# Patient Record
Sex: FEMALE | Race: WHITE
[De-identification: names, ages, dates, MRNs, and addresses within clinical notes are randomized per-mention and may not be internally consistent; named-entity substitution may affect disease eponyms.]

---

## 2019-07-14 ENCOUNTER — HOSPITAL ENCOUNTER (EMERGENCY)
Dept: HOSPITAL 11 - JP.ED | Age: 74
Discharge: HOME | End: 2019-07-14
Payer: MEDICARE

## 2019-07-14 ENCOUNTER — HOSPITAL ENCOUNTER (OUTPATIENT)
Dept: HOSPITAL 11 - JP.ED | Age: 74
Setting detail: OBSERVATION
LOS: 1 days | Discharge: HOME | End: 2019-07-15
Attending: FAMILY MEDICINE | Admitting: FAMILY MEDICINE
Payer: MEDICARE

## 2019-07-14 DIAGNOSIS — J45.41: Primary | ICD-10-CM

## 2019-07-14 DIAGNOSIS — Z79.51: ICD-10-CM

## 2019-07-14 DIAGNOSIS — Z79.899: ICD-10-CM

## 2019-07-14 DIAGNOSIS — Z79.01: ICD-10-CM

## 2019-07-14 DIAGNOSIS — F32.9: ICD-10-CM

## 2019-07-14 DIAGNOSIS — Z88.8: ICD-10-CM

## 2019-07-14 DIAGNOSIS — Z87.891: ICD-10-CM

## 2019-07-14 DIAGNOSIS — F41.9: ICD-10-CM

## 2019-07-14 DIAGNOSIS — F41.8: ICD-10-CM

## 2019-07-14 DIAGNOSIS — J44.9: ICD-10-CM

## 2019-07-14 DIAGNOSIS — Z91.09: ICD-10-CM

## 2019-07-14 DIAGNOSIS — J98.11: ICD-10-CM

## 2019-07-14 DIAGNOSIS — Z86.711: ICD-10-CM

## 2019-07-14 PROCEDURE — 71046 X-RAY EXAM CHEST 2 VIEWS: CPT

## 2019-07-14 PROCEDURE — 94640 AIRWAY INHALATION TREATMENT: CPT

## 2019-07-14 PROCEDURE — G0378 HOSPITAL OBSERVATION PER HR: HCPCS

## 2019-07-14 PROCEDURE — 99284 EMERGENCY DEPT VISIT MOD MDM: CPT

## 2019-07-14 NOTE — CRLCR
INDICATION: Dyspnea



TECHNIQUE: Chest radiograph 2 views



COMPARISON: None



FINDINGS: 



Mediastinum: The mediastinum is normal in appearance. The heart silhouette 

is normal in size and morphology. Mild elevation right hemidiaphragm is 

seen. 



Lung: Mild bibasilar discoid atelectasis is present. Left lateral 

costophrenic sulcus is excluded cannot be evaluated. No pneumothorax is 

identified. 







IMPRESSION: 



1. Mild bibasilar discoid atelectasis is present.



Dictated by Dg Chow MD @ 7/14/2019 11:53:14 PM



Dictated by: Dg Chow MD @ 07/14/2019 23:53:17



(Electronically Signed)

## 2019-07-14 NOTE — EDM.PDOC
ED HPI GENERAL MEDICAL PROBLEM





- General


Chief Complaint: Respiratory Problem


Stated Complaint: SOB


Time Seen by Provider: 07/14/19 17:38


Source of Information: Reports: Patient, Family


History Limitations: Reports: No Limitations





- History of Present Illness


INITIAL COMMENTS - FREE TEXT/NARRATIVE: 





73 yo asthmatic female presents to ER wheezy and SOB.  She has been without her 

inhaled steroid for 1 week.  She has had increase in wheezing for the last 2 

days and has been using her albuterol MDI.  Her granddaughter has steroid nebs 

and she did take one of these this afternoon.  She has seasonal allergies and 

takes daily low dose steroid.  denies fever, chills, or general illness 





- Related Data


 Allergies











Allergy/AdvReac Type Severity Reaction Status Date / Time


 


bupropion Allergy  Cannot Verified 07/14/19 17:09





   Remember  











Home Meds: 


 Home Meds





Budesonide [Pulmicort] 2 inh INH BID 07/14/19 [History]


Cetirizine HCl [Zyrtec] 10 mg PO DAILY 07/14/19 [History]


Citalopram Hydrobromide [Celexa] 20 mg PO DAILY 07/14/19 [History]


Fluticasone Propionate [Flonase Allergy Relief] 1 spray NASBOTH BID 07/14/19 [

History]


Furosemide [Lasix] 20 mg PO DAILY 07/14/19 [History]


Warfarin [Coumadin] 10 mg PO ASDIRECTED 07/14/19 [History]


Warfarin [Coumadin] 15 mg PO ASDIRECTED 07/14/19 [History]


predniSONE [Prednisone] 2.5 mg PO ASDIRECTED 07/14/19 [History]


predniSONE [Prednisone] 5 mg PO ASDIRECTED 07/14/19 [History]


traZODone 100 mg PO BEDTIME 07/14/19 [History]











Past Medical History


HEENT History: Reports: None


Respiratory History: Reports: Asthma, COPD, PE


Gastrointestinal History: Reports: None


Genitourinary History: Reports: None


Musculoskeletal History: Reports: Arthritis


Psychiatric History: Reports: Anxiety, Depression


Hematologic History: Reports: Other (See Below)


Other Hematologic History: takes coumadin for a clotting disorder





- Past Surgical History


Head Surgeries/Procedures: Reports: None


HEENT Surgical History: Reports: Cataract Surgery


Respiratory Surgical History: Reports: None


GI Surgical History: Reports: Other (See Below)


Other GI Surgeries/Procedures: hernia


Female  Surgical History: Reports: Hysterectomy


Musculoskeletal Surgical History: Reports: None


Dermatological Surgical History: Reports: None





Social & Family History





- Tobacco Use


Smoking Status *Q: Never Smoker





- Caffeine Use


Caffeine Use: Reports: Coffee, Soda, Tea





- Alcohol Use


Days Per Week of Alcohol Use: 7


Number of Drinks Per Day: 1


Total Drinks Per Week: 7





- Recreational Drug Use


Recreational Drug Use: No





ED ROS GENERAL





- Review of Systems


Review Of Systems: See Below


Constitutional: Denies: Fever, Chills


Respiratory: Reports: Shortness of Breath, Wheezing


Cardiovascular: Denies: Chest Pain


Skin: Denies: Rash





ED EXAM, GENERAL





- Physical Exam


Exam: See Below


Exam Limited By: No Limitations


General Appearance: Alert, WD/WN, No Apparent Distress


Head: Atraumatic, Normocephalic


Respiratory/Chest: Decreased Breath Sounds, Wheezing


Cardiovascular: Regular Rate, Rhythm, No Murmur, No Rub


GI/Abdominal: Normal Bowel Sounds, Soft, Non-Tender


Neurological: Alert, Oriented


Psychiatric: Normal Affect, Normal Mood


Skin Exam: Warm, Dry, Intact, Normal Color, No Rash


Lymphatic: No Adenopathy





Course





- Vital Signs


Last Recorded V/S: 


 Last Vital Signs











Temp  36.2 C   07/14/19 17:05


 


Pulse  80   07/14/19 18:04


 


Resp  20   07/14/19 17:05


 


BP  157/63 H  07/14/19 18:04


 


Pulse Ox  95   07/14/19 18:04














- Orders/Labs/Meds


Orders: 


 Active Orders 24 hr











 Category Date Time Status


 


 RT Aerosol Therapy [RC] ASDIRECTED Care  07/14/19 17:44 Active











Meds: 


Medications














Discontinued Medications














Generic Name Dose Route Start Last Admin





  Trade Name Christel  PRN Reason Stop Dose Admin


 


Albuterol/Ipratropium  3 ml  07/14/19 17:43  07/14/19 17:47





  Duoneb 3.0-0.5 Mg/3 Ml  NEB  07/14/19 17:44  3 ml





  ONETIME ONE   Administration





     





     





     





     














- Re-Assessments/Exams


Free Text/Narrative Re-Assessment/Exam: 





07/14/19 18:28


great response from duo neb, ease of breathing , wheezing resolved 





Departure





- Departure


Time of Disposition: 18:18


Disposition: Home, Self-Care 01


Condition: Good


Clinical Impression: 


Exacerbation of asthma


Qualifiers:


 Asthma severity: moderate Asthma persistence: persistent Qualified Code(s): 

J45.41 - Moderate persistent asthma with (acute) exacerbation








- Discharge Information


*PRESCRIPTION DRUG MONITORING PROGRAM REVIEWED*: Not Applicable


*COPY OF PRESCRIPTION DRUG MONITORING REPORT IN PATIENT DANA: Not Applicable


Instructions:  Asthma Attack


Referrals: 


PCP,None [Primary Care Provider] - 


Forms:  ED Department Discharge


Additional Instructions: 


prednisone 10 mg - take 4 tablets for 4 days, then 2 tablets for 3 days


use Duo neb every 4 hours as needed


increase fluid intake with goal of 1 liter








- My Orders


Last 24 Hours: 


My Active Orders





07/14/19 17:44


RT Aerosol Therapy [RC] ASDIRECTED 














- Assessment/Plan


Last 24 Hours: 


My Active Orders





07/14/19 17:44


RT Aerosol Therapy [RC] ASDIRECTED

## 2019-07-14 NOTE — EDM.PDOC
ED HPI GENERAL MEDICAL PROBLEM





- General


Chief Complaint: Respiratory Problem


Stated Complaint: SHORTNESS OF BREATH


Time Seen by Provider: 07/14/19 23:30


Source of Information: Reports: Patient, Family


History Limitations: Reports: No Limitations





- History of Present Illness


INITIAL COMMENTS - FREE TEXT/NARRATIVE: 





74-year-old female with long-standing asthma, is suffering from an acute 

exacerbation over the past 3 days since running out of her Pulmicort. She was 

in the emergency room earlier today, received a DuoNeb and responded well. A 

prescription was given for a nebulizer, prednisone, but she was to start the 

prednisone tomorrow. They tried to get the nebulizer filled but there was none 

available. She's been using her albuterol hand-held inhaler every few hours but 

feels worse than when she came in the first time. She arrived very anxious, 

short of breath with O2 saturations in the mid to upper 80s. She denies any 

pain.


Onset: Gradual


Duration: Day(s): (The last 48 hours)


Associated Symptoms: Reports: Cough, Shortness of Breath.  Denies: Fever/Chills

, Headaches


Treatments PTA: Reports: Other (see below)


Other Treatments PTA: benadryl





- Related Data


 Allergies











Allergy/AdvReac Type Severity Reaction Status Date / Time


 


bupropion Allergy  Cannot Verified 07/14/19 17:09





   Remember  











Home Meds: 


 Home Meds





Budesonide [Pulmicort] 2 inh INH BID 07/14/19 [History]


Cetirizine HCl [Zyrtec] 10 mg PO DAILY 07/14/19 [History]


Citalopram Hydrobromide [Celexa] 20 mg PO DAILY 07/14/19 [History]


Fluticasone Propionate [Flonase Allergy Relief] 1 spray NASBOTH BID 07/14/19 [

History]


Furosemide [Lasix] 20 mg PO DAILY 07/14/19 [History]


Warfarin [Coumadin] 10 mg PO ASDIRECTED 07/14/19 [History]


Warfarin [Coumadin] 15 mg PO ASDIRECTED 07/14/19 [History]


predniSONE [Prednisone] 2.5 mg PO ASDIRECTED 07/14/19 [History]


predniSONE [Prednisone] 5 mg PO ASDIRECTED 07/14/19 [History]


traZODone 100 mg PO BEDTIME 07/14/19 [History]











Past Medical History


HEENT History: Reports: None


Respiratory History: Reports: Asthma, COPD, PE


Gastrointestinal History: Reports: None


Genitourinary History: Reports: None


Musculoskeletal History: Reports: Arthritis


Psychiatric History: Reports: Anxiety, Depression


Hematologic History: Reports: Other (See Below)


Other Hematologic History: takes coumadin for a clotting disorder





- Past Surgical History


Head Surgeries/Procedures: Reports: None


HEENT Surgical History: Reports: Cataract Surgery


Respiratory Surgical History: Reports: None


GI Surgical History: Reports: Other (See Below)


Other GI Surgeries/Procedures: hernia


Female  Surgical History: Reports: Hysterectomy


Musculoskeletal Surgical History: Reports: None


Dermatological Surgical History: Reports: None





Social & Family History





- Tobacco Use


Smoking Status *Q: Never Smoker


Second Hand Smoke Exposure: No





- Caffeine Use


Caffeine Use: Reports: Coffee





- Recreational Drug Use


Recreational Drug Use: No





ED ROS GENERAL





- Review of Systems


Review Of Systems: See Below


Constitutional: Reports: Malaise.  Denies: Fever, Chills


HEENT: Denies: Throat Pain


Respiratory: Reports: Shortness of Breath, Wheezing, Cough.  Denies: Sputum


Cardiovascular: Denies: Chest Pain


GI/Abdominal: Reports: Nausea, Vomiting.  Denies: Abdominal Pain


: Reports: No Symptoms


Skin: Reports: No Symptoms


Neurological: Denies: Headache





ED EXAM, GENERAL





- Physical Exam


Exam: See Below


Exam Limited By: No Limitations


General Appearance: Alert, Anxious, Mild Distress


Head: Atraumatic


Respiratory/Chest: Respiratory Distress (Mild respiratory distress on arrival, 

audible expiratory wheezes), Accessory Muscle Use (Mild sensory muscle use and 

increased respirations at 22)


Cardiovascular: Regular Rate, Rhythm


Extremities: Normal Inspection


Neurological: Alert, Oriented


Psychiatric: Anxious


Skin Exam: Warm, Dry





Course





- Vital Signs


Last Recorded V/S: 


 Last Vital Signs











Temp  96.0 F   07/15/19 00:30


 


Pulse  79   07/15/19 00:30


 


Resp  20   07/15/19 00:30


 


BP  155/86 H  07/15/19 00:30


 


Pulse Ox  93 L  07/15/19 00:30














- Orders/Labs/Meds


Orders: 


 Medication Orders





Albuterol/Ipratropium (Duoneb 3.0-0.5 Mg/3 Ml)  3 ml NEB Q6H [facility]


Albuterol/Ipratropium (Duoneb 3.0-0.5 Mg/3 Ml)  3 ml NEB Q3H PRN


   PRN Reason: dyspnea


Cetirizine HCl (Zyrtec)  10 mg PO DAILY [facility]


Citalopram Hydrobromide (Celexa)  20 mg PO DAILY [facility]


Fluticasone Propionate (Flonase)  0 gm NASBOTH BID [facility]


Furosemide (Lasix)  20 mg PO DAILY [facility]


Methylprednisolone Sodium Succinate (Solu-Medrol)  125 mg IVPUSH Q6H [facility]


Non-Formulary Medication (Warfarin [Coumadin])  10 mg PO ASDIRECTED [facility]


Non-Formulary Medication (Warfarin [Coumadin])  15 mg PO ASDIRECTED [facility]


Trazodone HCl (Trazodone)  100 mg PO BEDTIME [facility]








Meds: 


Medications











Generic Name Dose Route Start Last Admin





  Trade Name Freq  PRN Reason Stop Dose Admin


 


Albuterol/Ipratropium  3 ml  07/15/19 00:45  





  Duoneb 3.0-0.5 Mg/3 Ml  NEB   





  Q6H [facility]   





     





     





     





     


 


Albuterol/Ipratropium  3 ml  07/15/19 01:38  





  Duoneb 3.0-0.5 Mg/3 Ml  NEB   





  Q3H PRN   





  dyspnea   





     





     





     


 


Cetirizine HCl  10 mg  07/15/19 09:00  





  Zyrtec  PO   





  DAILY [facility]   





     





     





     





     


 


Citalopram Hydrobromide  20 mg  07/15/19 09:00  





  Celexa  PO   





  DAILY [facility]   





     





     





     





     


 


Fluticasone Propionate  0 gm  07/15/19 09:00  





  Flonase  NASBOTH   





  BID [facility]   





     





     





     





     


 


Furosemide  20 mg  07/15/19 09:00  





  Lasix  PO   





  DAILY [facility]   





     





     





     





     


 


Methylprednisolone Sodium Succinate  125 mg  07/15/19 00:45  





  Solu-Medrol  IVPUSH   





  Q6H [facility]   





     





     





     





     


 


Non-Formulary Medication  10 mg  07/15/19 00:30  





  Warfarin [Coumadin]  PO   





  ASDIRECTED [facility]   





     





     





     





     


 


Non-Formulary Medication  15 mg  07/15/19 00:30  





  Warfarin [Coumadin]  PO   





  ASDIRECTED [facility]   





     





     





     





     


 


Trazodone HCl  100 mg  07/15/19 01:38  





  Trazodone  PO   





  BEDTIME [facility]   





     





     





     





     














Discontinued Medications














Generic Name Dose Route Start Last Admin





  Trade Name Freq  PRN Reason Stop Dose Admin


 


Albuterol/Ipratropium  3 ml  07/14/19 23:08  07/14/19 23:13





  Duoneb 3.0-0.5 Mg/3 Ml  NEB  07/14/19 23:09  3 ml





  ONETIME ONE   Administration





     





     





     





     


 


Methylprednisolone Sodium Succinate  125 mg  07/14/19 23:12  07/15/19 00:01





  Solu-Medrol  IVPUSH  07/14/19 23:13  125 mg





  ONETIME ONE   Administration





     





     





     





     


 


Trazodone HCl  100 mg  07/15/19 21:00  





  Trazodone  PO   





  BEDTIME [facility]   





     





     





     





     














- Re-Assessments/Exams


Free Text/Narrative Re-Assessment/Exam: 





07/14/19 23:51


A second DuoNeb was given and the patient responded well enough to have 

saturations in the 90s, normal respiratory rate but still significant wheezing 

and discomfort. An IV was started and she was given 125 mg of IV Solu-Medrol. 

Dr. Glynn was called and agreed to admit the patient to observation for 

treatment for persistent asthma and hypoxia. A two-view chest x-ray was done 

prior to admission and appeared normal.





Departure





- Departure


Time of Disposition: 01:22


Disposition: Admitted As Inpatient 66


Clinical Impression: 


Exacerbation of asthma


Qualifiers:


 Asthma severity: moderate Asthma persistence: persistent Qualified Code(s): 

J45.41 - Moderate persistent asthma with (acute) exacerbation








- Discharge Information

## 2019-07-15 RX ADMIN — DEXTROSE SCH MG: 50 INJECTION, SOLUTION INTRAVENOUS at 06:17

## 2019-07-15 RX ADMIN — DEXTROSE SCH: 50 INJECTION, SOLUTION INTRAVENOUS at 01:43

## 2019-07-15 NOTE — DISCH
REASON FOR ADMISSION:  A 74-year-old female with a history of moderate persistent asthmatic

lung disease, presented to the emergency room with increasing shortness of breath and wheeze

with accompanying cough.  She has a history of chronic respiratory disease, well controlled

with combination of inhaled therapies, including maintenance dose of Pulmicort and p.r.n.

use of albuterol rescue inhaler.  Three days prior to presentation, she noted her Pulmicort

prescription  and with increasing wheeze and shortness of breath.  She saw poor

response to use of albuterol.  She denies acute respiratory infections.  Can identify no new

irrigants, though does have a recognized history of environmental allergies with ongoing use

of Cetirizine with increasing shortness of breath.  She presented for re-evaluation.

 

PHYSICAL EXAMINATION:

VITAL SIGNS:  On admission, vital signs stable.  Afebrile, O2 saturations 93% on room air.

HEENT:  Unremarkable.  No perioral cyanosis.  No nasal congestion or coryza.

NECK:  No stridor or adenopathy.  Brisk carotid pulses.

LUNGS:  Post administration of DuoNeb nebulizer were clear, resonant, non-tachypneic without

wheeze or retractions.

HEART:  Regular without murmurs or gallops noted.

ABDOMEN:  Benign.

EXTREMITIES:  Warm, pink, and dry.  Good pulses.  No pitting edema.

 

IMAGING STUDIES:  Chest x-ray obtained on presentation.  Pulmonary fields are clear.  Mild

bibasilar atelectasis.  No effusion or evidence of infiltrate noted.

 

HOSPITAL COURSE:  Tory was admitted for continued supportive care for underlying asthma

exacerbation.  She was managed with administration of serial doses of Solu-Medrol, as well

as DuoNeb inhalation therapies on a schedule of q.i.d. and q.3 hours p.r.n.  She rested

comfortably through the night.  Vital signs remained stable without evidence of developing

hypoxia.  The following morning, she was upright, felt comfortable and denied any difficulty

with shortness of breath or wheeze.  She had no fevers, chills, cough or purulent sputum

production noted.  Anticipated plans for discharge were made.

 

DISCHARGE INSTRUCTIONS:  We will provide breakfast.  Encourage ambulation in the halls then

if stable, discharge to home this a.m.

 

CONDITION:  Improved.

 

ACTIVITY:

1. Up as tolerated.

2. Diet:  Regular.

3. Follow up with regular caregiver in 1 to 2 weeks.

4. Medications; DuoNeb 1 nebulizer treatment q.6 hours, Cetirizine 10 mg daily, citalopram

    20 mg daily, Flonase 1 spray to each nares b.i.d., furosemide 20 mg daily, warfarin 10

    mg Monday, Wednesday, Friday and 15 mg rest of the week, trazodone 100 mg p.o. at

    bedtime and prednisone in tapering dose over 6 days time.

 

ADMITTING DIAGNOSES:

1. Asthma exacerbation.

2. History of Coumadin anticoagulant therapy for reasons of recurrent pulmonary emboli in

    the remote past.

3. Depression with accompanying anxiety.

 

DISCHARGE DIAGNOSES:

1. Asthma exacerbation.

2. History of Coumadin anticoagulant therapy for reasons of recurrent pulmonary emboli in

    the remote past.

3. Depression with accompanying anxiety.

## 2019-07-15 NOTE — HP
IDENTIFYING DATA:  Tory Blackmon is a 74-year-old   female from [place], [place].

 

CHIEF COMPLAINT:  "My asthma is bad."

 

HISTORY OF PRESENT ILLNESS:  This 74-year-old female has a history of moderate persistent

asthmatic lung disease typically treated with Pulmicort inhalation therapies and p.r.n.

albuterol inhalations with use of metered-dose inhaler.  She reports, approximately 3 days

ago, her Pulmicort  and she, vacationing in the area, was not able to renew the

medication.  She anticipated she would manage with use of albuterol on a p.r.n. basis.  Over

the last 24 hours, she has had diminishing exercise tolerance, increasing wheeze, and

general fatigue.  She presented to the emergency room in the early evening hours and was

provided a DuoNeb nebulizer with good clinical response.  She was given a prescription for a

nebulizer with DuoNeb, as well as oral prednisone.  However, before she was able to fill the

prescription, she had worsening symptoms and returned to the emergency room.  She is now

admitted for an observation overnight.

 

She has had no fevers, chills, coryza, sore throat, cough, pleuritic pain, or sputum

production.  [facility] and has vacationed at this

institution in the past without difficulty.  She reports a history of multiple environmental

allergies.

 

ALLERGIES:  NO NOTED DRUG ALLERGIES WITH THE EXCEPTION OF BUPROPION.

 

 

PAST SURGICAL HISTORY:  Previous surgeries include total hysterectomy with cystocele and

rectocele repair for benign disease.  She has had cataract surgery.

 

PAST MEDICAL HISTORY:  She reports a history of recurrent multiple pulmonary emboli with

resultant longstanding anticoagulation with Coumadin.  She has a history of generalized

anxiety and depression, managed with pharmacologic therapy.

 

HABITS:  Remote history of tobacco use, none for many years.  Caffeine intake averages, 3-5

cups of coffee daily with alcohol use of 6 drinks weekly.

 

IMMUNIZATIONS:  Does not typically receive an annual influenza vaccine.  Unsure if she has

had pneumococcal vaccines.  Last tetanus was approximately 10 years ago.

 

CURRENT MEDICATIONS:  Pulmicort 2 inhalations b.i.d., cetirizine 10 mg daily, citalopram 20

mg daily, Flonase 1 spray to each naris b.i.d., furosemide 20 mg daily p.r.n. edema,

Warfarin 15 mg 4 days weekly and 10 mg 3 days weekly, trazodone 100 mg at bedtime.

Additionally, she was provided a prescription for oral prednisone with ER presentation this

evening has not yet filled this prescription.

 

SOCIAL HISTORY:  Retired.  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  She is .

 accompanies her today.  They are currently spending a week at a local resort in

[place] with family attending.

 

FAMILY HISTORY:  Granddaughter with history of asthmatic lung disease.  No recent family

history of acute respiratory infections or toxic exposures.

 

REVIEW OF SYSTEMS:  NEUROLOGIC:  No history of strokes or seizures.  No headaches.

CARDIAC:  No history of hypertension, diabetes, congenital heart disease, rheumatic fever,

murmur, chest pain, MI, or palpitations.

RESPIRATORY:  As above.

GASTROINTESTINAL:  Denies hepatitis, jaundice, chronic dyspepsia, constipation, melena, or

hematochezia.  GENITOURINARY:  Nocturia x2, scant overflow incontinence, previous cystocele

and rectocele repair.  MUSCULOSKELETAL:  General mild arthralgias with the intermittent use

of over-the-counter analgesics in the form of naproxen.

 

PHYSICAL EXAMINATION:

GENERAL:  Appearance is that of an adult female, in no acute distress, now lying quietly in

hospital bed.

VITAL SIGNS:  Initial temperature 97.5, pulse 89, respiratory rate 22, blood pressure

172/89, and O2 saturations 93% on room air.

HEENT:  Hearing is intact.  Sclerae anicteric.  Mildly labored breathing.  No perioral

cyanosis.

NECK:  Brisk carotid pulses.  No bruits, adenopathy, or thyromegaly.  No JVD.

LUNGS:  Mildly diminished breath sounds.  No wheezes, rales, or rhonchi noted.  No

retractions or resonant to percussion.

HEART:  Regular without murmurs or gallops noted.

ABDOMEN:  Soft and nontender.  No organomegaly.  Good femoral pulses.  No guarding, rebound,

or CVA pain.

GENITOURINARY:  Omitted.

RECTAL:  Omitted.

EXTREMITIES:  Good pulses.  No pitting edema.

SKIN:  Warm and pink.  Non-diaphoretic

 

LABORATORY DATA:  Labs on admission none.

 

2-view chest x-ray revealed mild bibasilar atelectasis.  No acute infiltrate, pneumothorax

or effusions noted.

 

IMPRESSION:

1. History of moderate persistent asthmatic lung disease with acute exacerbation.

2. Environmental allergies.

3. History of recurrent pulmonary emboli with chronic Coumadin anticoagulant therapy.

 

PLAN:  The patient appears stable with recently administered IV Solu-Medrol and DuoNebs.  We

will continue with monitoring on observation status.  Solu-Medrol to be administered at 6-

hour intervals with DuoNebs q.i.d. and every 3 hours p.r.n. shortness of breath or wheeze.

If she develops additional acute respiratory symptoms including pleuritic pain, fever,

cough, or sputum production; consider CT imaging to exclude recurrent pulmonary emboli

unlikely in light of fully anticoagulated status with patient report.  Follow up INR with

her caregiver in [place] 3 days ago was within therapeutic range.

 

We will continue with standard oral medications, regular diet as tolerated.  Allow light

activities.  Anticipate discharge within 1 to 2 days if respiratory status stabilizes.  We

will anticipate continued short course of oral systemic prednisone and inhaled therapies

including DuoNeb on a p.r.n. basis.

 

 

 

 

Dennis Glynn MD

DD:  07/15/2019 01:48:54

DT:  07/15/2019 03:01:26

Job #:  [id number]